# Patient Record
Sex: MALE
[De-identification: names, ages, dates, MRNs, and addresses within clinical notes are randomized per-mention and may not be internally consistent; named-entity substitution may affect disease eponyms.]

---

## 2023-09-13 ENCOUNTER — OFFICE VISIT (OUTPATIENT)
Dept: INTERNAL MEDICINE | Facility: OTHER | Age: 53
End: 2023-09-13
Payer: COMMERCIAL

## 2023-09-13 VITALS
HEIGHT: 74 IN | WEIGHT: 241.8 LBS | TEMPERATURE: 97.5 F | HEART RATE: 98 BPM | DIASTOLIC BLOOD PRESSURE: 98 MMHG | SYSTOLIC BLOOD PRESSURE: 147 MMHG | BODY MASS INDEX: 31.03 KG/M2 | OXYGEN SATURATION: 98 %

## 2023-09-13 DIAGNOSIS — E11.69 TYPE 2 DIABETES MELLITUS WITH OTHER SPECIFIED COMPLICATION, WITHOUT LONG-TERM CURRENT USE OF INSULIN (HCC): ICD-10-CM

## 2023-09-13 DIAGNOSIS — Z72.0 TOBACCO USE: ICD-10-CM

## 2023-09-13 PROBLEM — E11.9 DIABETES MELLITUS (HCC): Status: ACTIVE | Noted: 2023-09-13

## 2023-09-13 PROBLEM — I10 HYPERTENSION: Status: ACTIVE | Noted: 2023-09-13

## 2023-09-13 PROCEDURE — 3080F DIAST BP >= 90 MM HG: CPT

## 2023-09-13 PROCEDURE — 99204 OFFICE O/P NEW MOD 45 MIN: CPT | Mod: GC

## 2023-09-13 PROCEDURE — 3077F SYST BP >= 140 MM HG: CPT

## 2023-09-13 RX ORDER — ASPIRIN 81 MG/1
81 TABLET ORAL DAILY
COMMUNITY

## 2023-09-13 RX ORDER — NICOTINE 21 MG/24HR
1 PATCH, TRANSDERMAL 24 HOURS TRANSDERMAL EVERY 24 HOURS
Qty: 14 PATCH | Refills: 0 | Status: SHIPPED | OUTPATIENT
Start: 2023-09-13 | End: 2023-09-27

## 2023-09-13 ASSESSMENT — ENCOUNTER SYMPTOMS
SENSORY CHANGE: 0
ABDOMINAL PAIN: 0
FOCAL WEAKNESS: 0
DIZZINESS: 0
CONSTIPATION: 0
TINGLING: 0
VOMITING: 0
WEIGHT LOSS: 0
ORTHOPNEA: 0
WEAKNESS: 0
NAUSEA: 0
DIARRHEA: 0

## 2023-09-13 ASSESSMENT — PATIENT HEALTH QUESTIONNAIRE - PHQ9: CLINICAL INTERPRETATION OF PHQ2 SCORE: 0

## 2023-09-13 NOTE — ASSESSMENT & PLAN NOTE
Last A1c: 5.7% (07/2023); prior 6.4% in 01/2023, has progressively down trended from 14% back in 02/2021.  Compliant with treatment.   Healthy eating and daily 30 min walks with 80 lbs weight loss in past year.  Monofilament test negative.   Will decrease Metformin to 1000 mg daily.   Will re-assess with lab work in 6 months.

## 2023-09-13 NOTE — ASSESSMENT & PLAN NOTE
0.5 ppd currently.   > 30 years of use.   Interested in cessation.  Will initiate with Nicotine patches: 14 mg for 14 days, then 7 mg for 14 days. If still having problems with craving with consider pharmacological options.

## 2023-09-13 NOTE — LETTER
[facility]  Omid Clemens M.D.  [address] [facility] [place] [address]  Fax: [phone]   Authorization for Release/Disclosure of   Protected Health Information   Name: MARYLIN KAPLAN : 1970 SSN: [id number]   Address: [id number] [place] [id number] Phone:    [phone] (home)    I authorize the entity listed below to release/disclose the PHI below to:   [facility]/Omid Clemens M.D. and Omid Clemens M.D.   Provider or Entity Name:     Address   City, State, [facility]   Phone:      Fax:     Reason for request: continuity of care   Information to be released:    [  ] LAST COLONOSCOPY,  including any PATH REPORT and follow-up  [  ] LAST FIT/COLOGUARD RESULT [  ] LAST DEXA  [  ] LAST MAMMOGRAM  [  ] LAST PAP  [  ] LAST LABS [  ] RETINA EXAM REPORT  [  ] IMMUNIZATION RECORDS  [  ] Release all info      [  ] Check here and initial the line next to each item to release ALL health information INCLUDING  _____ Care and treatment for drug and / or alcohol abuse  _____ HIV testing, infection status, or AIDS  _____ Genetic Testing    DATES OF SERVICE OR TIME PERIOD TO BE DISCLOSED: _____________  I understand and acknowledge that:  * This Authorization may be revoked at any time by you in writing, except if your health information has already been used or disclosed.  * Your health information that will be used or disclosed as a result of you signing this authorization could be re-disclosed by the recipient. If this occurs, your re-disclosed health information may no longer be protected by State or Federal laws.  * You may refuse to sign this Authorization. Your refusal will not affect your ability to obtain treatment.  * This Authorization becomes effective upon signing and will  on (date) __________.      If no date is indicated, this Authorization will  one (1) year from the signature date.    Name: Marylin Kaplan  Signature: Date:   2023     PLEASE FAX REQUESTED RECORDS BACK  TO: [phone]

## 2023-09-13 NOTE — PATIENT INSTRUCTIONS
Continue healthy eating.   Continue to stay physically active.   Complete lab work prior to next appointment.   Decrease Metformin for 1000 mg once daily.   Use Nicotine patches in the following way:   - 14 mg patch once a day for 14 days and then transition to 7 mg patch once a       day for 14 days and then stop.   Follow-up in 6 months.

## 2023-09-13 NOTE — ASSESSMENT & PLAN NOTE
BP well controlled.   Compliant with Lisinopril 40 mg daily.   No signs/symptoms of end organ damage.   No changes in management at this time.   1-2 times/week BP logging and bring to next appointment.   If persistently elevated > 160/90 mmHg notify us and schedule appointment.